# Patient Record
Sex: FEMALE | Race: WHITE
[De-identification: names, ages, dates, MRNs, and addresses within clinical notes are randomized per-mention and may not be internally consistent; named-entity substitution may affect disease eponyms.]

---

## 2018-02-02 ENCOUNTER — HOSPITAL ENCOUNTER (EMERGENCY)
Dept: HOSPITAL 80 - FED | Age: 28
Discharge: HOME | End: 2018-02-02
Payer: MEDICAID

## 2018-02-02 VITALS
HEART RATE: 79 BPM | TEMPERATURE: 98.4 F | RESPIRATION RATE: 16 BRPM | SYSTOLIC BLOOD PRESSURE: 123 MMHG | OXYGEN SATURATION: 97 % | DIASTOLIC BLOOD PRESSURE: 84 MMHG

## 2018-02-02 DIAGNOSIS — F17.200: ICD-10-CM

## 2018-02-02 DIAGNOSIS — S43.402A: ICD-10-CM

## 2018-02-02 DIAGNOSIS — S93.601A: Primary | ICD-10-CM
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
## 2018-02-02 NOTE — EDPHY
General


Narrative: 





CHIEF COMPLAINT: 


Fall, left shoulder pain, right foot pain





HISTORY OF PRESENT ILLNESS: 


Patient complains of left shoulder pain right foot pain status post fall.  She 

says "I was messing around on the stairs and I fell."  She reports landing on 

her left shoulder and right foot.  She feels as though she had to push her left 

shoulder back into place.  She is moderate to severe pain in the left shoulder 

right foot.  No head strike or loss of consciousness.  No chest pain or 

shortness of breath. No neck pain. No abdominal pain or injury. Worse with 

palpation and movement.  She was able to walk into the emergency department.  

No other associated complaints or modifying factors.





ESTABLISHED ORTHOPEDIST:


None





REVIEW OF SYSTEMS:


Ten systems reviewed and are negative unless otherwise noted in the HPI








PAST MEDICAL HISTORY: 


Uncomplicated





PAST SURGICAL HISTORY:


No recent surgeries





SOCIAL HISTORY:


Daily smoker.  Occasional drug use.





FAMILY HISTORY:


Noncontributory





EXAMINATION


General Appearance:  Alert, no distress


HEENT:  Normocephalic atraumatic. Pupils equal round reactive


Neck:  Supple without meningeal signs


Cardiovascular:  Symmetric radial pulses 2+.


Neurological:  A&O, sensory symmetric, strength symmetric


Skin:  Warm and dry, no rash. No petechiae or purpura.  No laceration abrasion 

or contusion


Extremities:  Superficial tenderness of the left shoulder and right ankle of 

the midfoot.  No step-off or deformity.  Range of motion is intact but painful.

  Neurovascular intact distal to the areas of pain.


Psychiatric:  Mood and affect normal








DIFFERENTIAL DIAGNOSES:


Including but not limited to fracture, sprain, strain, contusion, dislocation








MDM:


3:40 p.m.


Mechanical fall with left shoulder right foot pain. No outward signs of trauma.

  Neurovascular intact. No head injury or indication for CT scan.  She is 

resting comfortably in no acute distress with vital signs within normal limits.





4:25 p.m.


X-rays are negative for any acute findings.  I have re-evaluated the patient 

and she is in no acute distress. I have discussed discharge home with ice, 

elevation and over-the-counter anti-inflammatories as needed.  She will contact 

her existing primary care physician and I have provided the on-call orthopedist 

for outpatient follow-up.  I did not provide any narcotic prescriptions for her 

as she has had 3 prescriptions in the past 30 days.  She is discharged home 

stable condition.





SUPERVISION:


This patient was independently evaluated without direct involvement of or 

examination by the attending physician. 





ED Precautions: 


Worsening pain. Erythema, edema, cyanosis, pallor, paresthesia or anesthesia.








- Diagnostics


Imaging Results: 


 Imaging Impressions





Foot X-Ray  02/02/18 15:38


Impression: Negative.


 


 


RIGHT FOOT (3 Views, at 3:57 PM): Bone mineralization is preserved. There is no 

fracture or dislocation. The tarsometatarsal alignment is anatomic. There is a 

tiny plantar calcaneal enthesophyte. There is no ankle joint effusion.


 


Impression: There is no acute osseous abnormality identified.








Shoulder X-Ray  02/02/18 15:38


Impression: Negative.


 


 


RIGHT FOOT (3 Views, at 3:57 PM): Bone mineralization is preserved. There is no 

fracture or dislocation. The tarsometatarsal alignment is anatomic. There is a 

tiny plantar calcaneal enthesophyte. There is no ankle joint effusion.


 


Impression: There is no acute osseous abnormality identified.














- History


Smoking Status: Light smoker





- Objective


Vital Signs: 


 Initial Vital Signs











Temperature (C)  98.1 F   02/02/18 15:23


 


Heart Rate  83   02/02/18 15:23


 


Respiratory Rate  18   02/02/18 15:23


 


Blood Pressure  134/86 H  02/02/18 15:23


 


O2 Sat (%)  99   02/02/18 15:23








 











O2 Delivery Mode               Room Air














Allergies/Adverse Reactions: 


 





codeine Allergy (Mild, Verified 12/05/16 12:38)


 Itching


Sulfa (Sulfonamide Antibiotics) Allergy (Mild, Verified 12/05/16 12:38)


 sores in mouth








Home Medications: 














 Medication  Instructions  Recorded


 


Hydrocodone/APAP 5/325 [Norco 1 tab PO Q6 PRN #15 tab 12/05/16





5/325 (RX)]  


 


No Known Home Meds  12/05/16














Departure





- Departure


Disposition: Home, Routine, Self-Care


Clinical Impression: 


Sprain of shoulder, left


Qualifiers:


 Encounter type: initial encounter Shoulder sprain type: unspecified sprain 

Qualified Code(s): S43.402A - Unspecified sprain of left shoulder joint, 

initial encounter





Sprain of right foot


Qualifiers:


 Encounter type: initial encounter Qualified Code(s): S93.601A - Unspecified 

sprain of right foot, initial encounter





Condition: Good


Instructions:  Shoulder Sprain (ED), Foot Sprain (ED)


Additional Instructions: 


1.  Ice and elevation as needed


2.  Over-the-counter anti-inflammatories as discussed as needed


3.  Follow up with primary care physician


Four.  Follow up with on-call orthopedist as provided


Referrals: 


Gurjit Rojas MD [Medical Doctor] - As per Instructions


Stand Alone Forms:  Work Excuse